# Patient Record
Sex: MALE | Race: WHITE | NOT HISPANIC OR LATINO | Employment: FULL TIME | URBAN - NONMETROPOLITAN AREA
[De-identification: names, ages, dates, MRNs, and addresses within clinical notes are randomized per-mention and may not be internally consistent; named-entity substitution may affect disease eponyms.]

---

## 2022-01-24 ENCOUNTER — OFFICE VISIT (OUTPATIENT)
Dept: CARDIOLOGY | Facility: CLINIC | Age: 47
End: 2022-01-24

## 2022-01-24 VITALS
DIASTOLIC BLOOD PRESSURE: 82 MMHG | BODY MASS INDEX: 27.52 KG/M2 | WEIGHT: 226 LBS | OXYGEN SATURATION: 99 % | SYSTOLIC BLOOD PRESSURE: 148 MMHG | TEMPERATURE: 97 F | RESPIRATION RATE: 18 BRPM | HEIGHT: 76 IN | HEART RATE: 86 BPM

## 2022-01-24 DIAGNOSIS — R07.89 CHEST PAIN, ATYPICAL: Primary | ICD-10-CM

## 2022-01-24 DIAGNOSIS — I10 ESSENTIAL HYPERTENSION: ICD-10-CM

## 2022-01-24 DIAGNOSIS — Z72.0 TOBACCO USE: ICD-10-CM

## 2022-01-24 PROCEDURE — 99204 OFFICE O/P NEW MOD 45 MIN: CPT | Performed by: INTERNAL MEDICINE

## 2022-01-24 RX ORDER — LOSARTAN POTASSIUM 50 MG/1
50 TABLET ORAL DAILY
Qty: 90 TABLET | Refills: 1 | Status: SHIPPED | OUTPATIENT
Start: 2022-01-24 | End: 2022-02-28

## 2022-01-24 NOTE — PROGRESS NOTES
[facility]  [facility] CARDIOLOGY  [address] [place] [address]  Dept: [phone]  Dept Fax: [phone]    Aniket Solitario  1975    New Patient Office Note    History of Present Illness:  Aniket Solitario is a 46 y.o. male who presents to the clinic for evaluation of chest pain- male 46b years old who states I have been recently dx with OCD and depression, and I am having chest pain for 1 month, his chest poain is on the right side upper chest, no relation with activities or meals, he states is there all the time, he also states that his BP has been elevated for the last year here is 145.,80, his EKg is normal, his cardiac exam is normal, will start Losartan 50- mg, will set him for a treadmill echo, Likely non cardiac chest pain,.  We might need to add Inderal after , he denies family history for CAD     The following portions of the patient's history were reviewed and updated as appropriate: allergies, current medications, past family history, past medical history, past social history, past surgical history and problem list.    Medications:  hydrOXYzine pamoate  sertraline    Subjective  Allergies   Allergen Reactions   • Penicillins Rash        Past Medical History:   Diagnosis Date   • Anxiety    • Ear problem    • History of torn meniscus of left knee        Past Surgical History:   Procedure Laterality Date   • COSMETIC SURGERY      EAR   • KNEE ARTHROSCOPY Left     TORN MENISCUS   • TONSILLECTOMY         Family History   Problem Relation Age of Onset   • Anxiety disorder Mother    • Depression Mother    • Cancer Maternal Grandfather         Social History     Socioeconomic History   • Marital status:    Tobacco Use   • Smoking status: Current Some Day Smoker     Packs/day: 1.00     Types: Cigarettes   • Smokeless tobacco: Never Used   Vaping Use   • Vaping Use: Never used   Substance and Sexual Activity   • Alcohol use: Yes   • Drug use: Never   • Sexual activity:  "Defer       Review of Systems   Constitutional: Negative.    HENT: Negative.    Respiratory: Negative.    Cardiovascular: Positive for chest pain.   Endocrine: Negative.    Genitourinary: Negative.    Musculoskeletal: Negative.    Skin: Negative.    Allergic/Immunologic: Negative.    Neurological: Negative.    Hematological: Negative.    Psychiatric/Behavioral: Negative.    All other systems reviewed and are negative.      Cardiovascular Procedures    ECHO/MUGA:   STRESS TESTS:   CARDIAC CATH:   DEVICES:   HOLTER:   CT/MRI:   VASCULAR:   CARDIOTHORACIC:     Objective  Vitals:    01/24/22 1300   BP: 148/82   BP Location: Left arm   Patient Position: Lying   Cuff Size: Adult   Pulse: 86   Resp: 18   Temp: 97 °F (36.1 °C)   TempSrc: Infrared   SpO2: 99%   Weight: 103 kg (226 lb)   Height: 193 cm (76\")   PainSc: 0-No pain       Physical Exam  Vitals reviewed.   Constitutional:       Appearance: Healthy appearance. Not in distress.   Eyes:      Pupils: Pupils are equal, round, and reactive to light.   HENT:    Mouth/Throat:      Pharynx: Oropharynx is clear.   Neck:      Thyroid: Thyroid normal.      Vascular: No JVR. JVD normal.   Pulmonary:      Effort: Pulmonary effort is normal.      Breath sounds: Normal breath sounds. No wheezing. No rhonchi. No rales.   Chest:      Chest wall: Not tender to palpatation.   Cardiovascular:      PMI at left midclavicular line. Normal rate. Regular rhythm. Normal S1. Normal S2.      Murmurs: There is no murmur.      No gallop. No click. No rub.   Pulses:     Intact distal pulses.   Edema:     Peripheral edema absent.   Abdominal:      General: Bowel sounds are normal.      Palpations: Abdomen is soft.      Tenderness: There is no abdominal tenderness.   Musculoskeletal: Normal range of motion.         General: No tenderness.      Cervical back: Normal range of motion and neck supple. Skin:     General: Skin is warm and dry.   Neurological:      General: No focal deficit present.      " Mental Status: Alert and oriented to person, place and time.          Diagnostic Data  Procedures    Assessment and Plan  Diagnoses and all orders for this visit:    Chest pain, atypical- Likely non cardiac, will set him for a treadmill echo, his Lipids are goiod,m no family history for CAD    Tobacco use- He smokes just 1-2 cigarettes daily, advised to quit,   Hypertension- Will start Losartan 50 mg BP is 145l.80        No follow-ups on file.    Kwame Hall MD  01/24/2022

## 2022-01-27 ENCOUNTER — PATIENT ROUNDING (BHMG ONLY) (OUTPATIENT)
Dept: CARDIOLOGY | Facility: CLINIC | Age: 47
End: 2022-01-27

## 2022-01-27 NOTE — PROGRESS NOTES
January 27, 2022    Helebony, may I speak with Aniket Solitario?    My name is NICKOLAS     I am  with [facility]  [facility] CARDIOLOGY  [address] [place] [address].       UNABLE TO REACH PATIENT

## 2022-02-28 ENCOUNTER — OFFICE VISIT (OUTPATIENT)
Dept: CARDIOLOGY | Facility: CLINIC | Age: 47
End: 2022-02-28

## 2022-02-28 VITALS
BODY MASS INDEX: 27.03 KG/M2 | SYSTOLIC BLOOD PRESSURE: 136 MMHG | OXYGEN SATURATION: 99 % | WEIGHT: 222 LBS | DIASTOLIC BLOOD PRESSURE: 74 MMHG | RESPIRATION RATE: 14 BRPM | TEMPERATURE: 97 F | HEART RATE: 73 BPM | HEIGHT: 76 IN

## 2022-02-28 DIAGNOSIS — I10 ESSENTIAL HYPERTENSION: ICD-10-CM

## 2022-02-28 DIAGNOSIS — R07.89 CHEST PAIN, ATYPICAL: Primary | ICD-10-CM

## 2022-02-28 DIAGNOSIS — Z72.0 TOBACCO USE: ICD-10-CM

## 2022-02-28 PROCEDURE — 93000 ELECTROCARDIOGRAM COMPLETE: CPT | Performed by: INTERNAL MEDICINE

## 2022-02-28 PROCEDURE — 99214 OFFICE O/P EST MOD 30 MIN: CPT | Performed by: INTERNAL MEDICINE

## 2022-02-28 NOTE — PROGRESS NOTES
[facility]  [facility] CARDIOLOGY  [address] [place] [address]  Dept: [phone]  Dept Fax: [phone]    Aniket Solitario  1975    Follow Up Office Visit Note    History of Present Illness:  Aniket Solitario is a 46 y.o. male who presents to the clinic for Follow-up and Chest Pain. He did go for stress echo and he walked 10 minutes, negative for ischemia, pain is constant atypical, likely non cardiac, he needs to see his PCP and counselor , will follow him up in 3 months    The following portions of the patient's history were reviewed and updated as appropriate: allergies, current medications, past family history, past medical history, past social history, past surgical history and problem list.    Medications:  hydrOXYzine pamoate  sertraline    Subjective  Allergies   Allergen Reactions   • Penicillins Rash        Past Medical History:   Diagnosis Date   • Anxiety    • Ear problem    • History of torn meniscus of left knee        Past Surgical History:   Procedure Laterality Date   • COSMETIC SURGERY      EAR   • KNEE ARTHROSCOPY Left     TORN MENISCUS   • TONSILLECTOMY         Family History   Problem Relation Age of Onset   • Anxiety disorder Mother    • Depression Mother    • Cancer Maternal Grandfather         Social History     Socioeconomic History   • Marital status:    Tobacco Use   • Smoking status: Current Some Day Smoker     Packs/day: 1.00     Types: Cigarettes   • Smokeless tobacco: Never Used   Vaping Use   • Vaping Use: Never used   Substance and Sexual Activity   • Alcohol use: Yes   • Drug use: Never   • Sexual activity: Defer       Review of Systems   Constitutional: Negative.    HENT: Negative.    Respiratory: Negative.    Cardiovascular: Positive for chest pain.   Endocrine: Negative.    Genitourinary: Negative.    Musculoskeletal: Negative.    Skin: Negative.    Allergic/Immunologic: Negative.    Neurological: Negative.    Hematological: Negative.   "  Psychiatric/Behavioral: Negative.        Cardiovascular Procedures    ECHO/MUGA:   STRESS TESTS:   CARDIAC CATH:   DEVICES:   HOLTER:   CT/MRI:   VASCULAR:   CARDIOTHORACIC:     Objective  Vitals:    02/28/22 0941   BP: 136/74   BP Location: Left arm   Patient Position: Lying   Cuff Size: Adult   Pulse: 73   Resp: 14   Temp: 97 °F (36.1 °C)   TempSrc: Infrared   SpO2: 99%   Weight: 101 kg (222 lb)   Height: 193 cm (76\")   PainSc: 0-No pain     Body mass index is 27.02 kg/m².     Physical Exam  Constitutional:       Appearance: Healthy appearance. Not in distress.   Neck:      Vascular: No JVR. JVD normal.   Pulmonary:      Effort: Pulmonary effort is normal.      Breath sounds: Normal breath sounds. No wheezing. No rhonchi. No rales.   Chest:      Chest wall: Not tender to palpatation.   Cardiovascular:      PMI at left midclavicular line. Normal rate. Regular rhythm. Normal S1. Normal S2.      Murmurs: There is no murmur.      No gallop. No click. No rub.   Pulses:     Intact distal pulses.   Edema:     Peripheral edema absent.   Abdominal:      General: Bowel sounds are normal.      Palpations: Abdomen is soft.      Tenderness: There is no abdominal tenderness.   Musculoskeletal: Normal range of motion.         General: No tenderness. Skin:     General: Skin is warm and dry.   Neurological:      General: No focal deficit present.      Mental Status: Alert and oriented to person, place and time.          Diagnostic Data    ECG 12 Lead    Date/Time: 2/28/2022 10:00 AM  Performed by: Kwame Hall MD  Authorized by: Kwame Hall MD   Comparison: compared with previous ECG from 12/21/2021  Similar to previous ECG  Rate: normal  QRS axis: normal    Clinical impression: normal ECG            Assessment and Plan  Diagnoses and all orders for this visit:    Chest pain, atypical-Likely non cardiac, his stress echo was normal, will observe,    Tobacco use- Advised to quit     Essential hypertension- The " BP is 135/.80 advised to take The losartan before bed time         No follow-ups on file.    Kwame Hall MD  02/28/2022